# Patient Record
Sex: FEMALE | Race: OTHER | HISPANIC OR LATINO | URBAN - METROPOLITAN AREA
[De-identification: names, ages, dates, MRNs, and addresses within clinical notes are randomized per-mention and may not be internally consistent; named-entity substitution may affect disease eponyms.]

---

## 2022-04-30 ENCOUNTER — TELEPHONE ENCOUNTER (OUTPATIENT)
Dept: URBAN - METROPOLITAN AREA CLINIC 121 | Facility: CLINIC | Age: 37
End: 2022-04-30

## 2022-05-01 ENCOUNTER — TELEPHONE ENCOUNTER (OUTPATIENT)
Dept: URBAN - METROPOLITAN AREA CLINIC 121 | Facility: CLINIC | Age: 37
End: 2022-05-01

## 2023-02-28 ENCOUNTER — OFFICE VISIT (OUTPATIENT)
Dept: URBAN - METROPOLITAN AREA CLINIC 92 | Facility: CLINIC | Age: 38
End: 2023-02-28

## 2023-02-28 RX ORDER — DICYCLOMINE HYDROCHLORIDE 10 MG/1
TAKE 2 CAPSULES (20 MG) BY ORAL ROUTE 4 TIMES PER DAY CAPSULE ORAL
Qty: 0 | Refills: 0 | COMMUNITY
Start: 1900-01-01

## 2023-02-28 NOTE — HPI-TODAY'S VISIT:
This is a 37-year-old female referred by dr Luis Fernando Mayfield for GI consultation and a copy will be sent to the referring provider.  She saw Dr. Su in 2018 for abdominal pain.  Patient had an upper endoscopy with him on October 16, 2018.  This revealed evidence of gastric bypass and no gross abnormality seen.  Biopsy showed no abnormalities of the jejunal limb and gastric biopsy was also normal.  Patient had a CT abdomen pelvis on August 24, 2018 that revealed no acute process.

## 2023-04-03 ENCOUNTER — OFFICE VISIT (OUTPATIENT)
Dept: URBAN - METROPOLITAN AREA CLINIC 124 | Facility: CLINIC | Age: 38
End: 2023-04-03

## 2023-05-11 ENCOUNTER — WEB ENCOUNTER (OUTPATIENT)
Dept: URBAN - METROPOLITAN AREA CLINIC 17 | Facility: CLINIC | Age: 38
End: 2023-05-11

## 2023-05-15 ENCOUNTER — OFFICE VISIT (OUTPATIENT)
Dept: URBAN - METROPOLITAN AREA CLINIC 17 | Facility: CLINIC | Age: 38
End: 2023-05-15
Payer: SELF-PAY

## 2023-05-15 VITALS
HEIGHT: 65 IN | HEART RATE: 75 BPM | SYSTOLIC BLOOD PRESSURE: 101 MMHG | DIASTOLIC BLOOD PRESSURE: 55 MMHG | TEMPERATURE: 98.1 F | WEIGHT: 227 LBS | BODY MASS INDEX: 37.82 KG/M2

## 2023-05-15 DIAGNOSIS — R93.5 ABNORMAL CT OF THE ABDOMEN: ICD-10-CM

## 2023-05-15 DIAGNOSIS — E66.9 OBESITY (BMI 30-39.9): ICD-10-CM

## 2023-05-15 DIAGNOSIS — R10.84 GENERALIZED ABDOMINAL PAIN: ICD-10-CM

## 2023-05-15 DIAGNOSIS — R19.4 CHANGE IN BOWEL HABITS: ICD-10-CM

## 2023-05-15 PROBLEM — 162864005: Status: ACTIVE | Noted: 2023-05-15

## 2023-05-15 PROCEDURE — 99203 OFFICE O/P NEW LOW 30 MIN: CPT | Performed by: INTERNAL MEDICINE

## 2023-05-15 RX ORDER — DICYCLOMINE HYDROCHLORIDE 10 MG/1
TAKE 2 CAPSULES (20 MG) BY ORAL ROUTE 4 TIMES PER DAY CAPSULE ORAL
Qty: 0 | Refills: 0 | Status: ON HOLD | COMMUNITY
Start: 1900-01-01

## 2023-05-15 NOTE — HPI-TODAY'S VISIT:
5/15/23 36 yo lady pt of Shay Moore.  A .  Being seen for abd cramping/pain/discomfort Seen in ER 5/11 and CT Abd/pelvis wo IV constrast showing s/p babak, non obs renal calculus, gastric bypass, element of dysmotility with abundant gas in SB, watery rectal contents ? diarrhea, fibroids.  CMP and CBC unremarkable with Hb 12.9 and low mcv 80.8. Pt says she is here because they found a bateria in her stomach and told she had some stools in her colon Says she went for abdominal pain which is resolved.  But now she wants to be investigated due to findings on the CT.  BMs used to be 2-3 times a day but now only once a day. Stools are not hard or difficult to evacuate.  Some nausea, no vomiting.  Does not use nsaids.  GAstric bypass  was in 2014.  Abd pain for which she went to ER started 5 days ago and has resolved.

## 2023-05-18 LAB
H PYLORI BREATH TEST: NOT DETECTED
H. PYLORI BREATH TEST: NOT DETECTED
INTERPRETATION: NOT DETECTED

## 2023-06-29 ENCOUNTER — OFFICE VISIT (OUTPATIENT)
Dept: URBAN - METROPOLITAN AREA CLINIC 17 | Facility: CLINIC | Age: 38
End: 2023-06-29

## 2023-06-29 VITALS — HEIGHT: 65 IN

## 2023-06-29 RX ORDER — DICYCLOMINE HYDROCHLORIDE 10 MG/1
TAKE 2 CAPSULES (20 MG) BY ORAL ROUTE 4 TIMES PER DAY CAPSULE ORAL
Qty: 0 | Refills: 0 | Status: ACTIVE | COMMUNITY
Start: 1900-01-01

## 2023-12-04 ENCOUNTER — DASHBOARD ENCOUNTERS (OUTPATIENT)
Age: 38
End: 2023-12-04

## 2023-12-07 ENCOUNTER — OFFICE VISIT (OUTPATIENT)
Dept: URBAN - METROPOLITAN AREA CLINIC 82 | Facility: CLINIC | Age: 38
End: 2023-12-07

## 2023-12-07 RX ORDER — DICYCLOMINE HYDROCHLORIDE 10 MG/1
TAKE 2 CAPSULES (20 MG) BY ORAL ROUTE 4 TIMES PER DAY CAPSULE ORAL
Qty: 0 | Refills: 0 | Status: DISCONTINUED | COMMUNITY
Start: 1900-01-01

## 2024-01-17 ENCOUNTER — OFFICE VISIT (OUTPATIENT)
Dept: URBAN - METROPOLITAN AREA CLINIC 82 | Facility: CLINIC | Age: 39
End: 2024-01-17

## 2024-04-18 ENCOUNTER — OV EP (OUTPATIENT)
Dept: URBAN - METROPOLITAN AREA CLINIC 82 | Facility: CLINIC | Age: 39
End: 2024-04-18